# Patient Record
Sex: FEMALE | Race: BLACK OR AFRICAN AMERICAN | URBAN - METROPOLITAN AREA
[De-identification: names, ages, dates, MRNs, and addresses within clinical notes are randomized per-mention and may not be internally consistent; named-entity substitution may affect disease eponyms.]

---

## 2017-09-04 ENCOUNTER — ED HISTORICAL/CONVERTED ENCOUNTER (OUTPATIENT)
Dept: OTHER | Age: 72
End: 2017-09-04

## 2017-10-22 ENCOUNTER — ED HISTORICAL/CONVERTED ENCOUNTER (OUTPATIENT)
Dept: OTHER | Age: 72
End: 2017-10-22

## 2019-08-04 ENCOUNTER — ED HISTORICAL/CONVERTED ENCOUNTER (OUTPATIENT)
Dept: OTHER | Age: 74
End: 2019-08-04

## 2020-08-27 ENCOUNTER — ED HISTORICAL/CONVERTED ENCOUNTER (OUTPATIENT)
Dept: OTHER | Age: 75
End: 2020-08-27

## 2022-11-08 DIAGNOSIS — M25.561 RIGHT KNEE PAIN, UNSPECIFIED CHRONICITY: Primary | ICD-10-CM

## 2022-11-11 ENCOUNTER — OFFICE VISIT (OUTPATIENT)
Dept: ORTHOPEDIC SURGERY | Age: 77
End: 2022-11-11

## 2022-11-11 VITALS — BODY MASS INDEX: 35.16 KG/M2 | HEIGHT: 67 IN | WEIGHT: 224 LBS

## 2022-11-11 RX ORDER — HYDROCHLOROTHIAZIDE 25 MG/1
TABLET ORAL
COMMUNITY
Start: 2022-09-22

## 2022-11-11 RX ORDER — OMEPRAZOLE 20 MG/1
CAPSULE, DELAYED RELEASE ORAL
COMMUNITY
Start: 2022-09-22

## 2022-11-11 RX ORDER — LISINOPRIL 20 MG/1
TABLET ORAL
COMMUNITY
Start: 2022-09-22

## 2022-11-11 NOTE — LETTER
Ravin    1945   [id number]       11/11/2022       I hereby authorize and direct Art Black MD, Brittni Grider, and whomever he may designate as his associate to perform upon myself the following procedure:    Injection of: Kenalog, Supartz, Euflexxa, Orthovisc in the Right/Left ____________________. If any unforeseen condition arises in the course of the procedure, I further authorize him and his associated and/or assistant(s) to do whatever he/she deems advisable. The nature, purpose, benefits, risks, side effects, likelihood of achieving goals, and potential problems that might occur during recuperation, risks for not receiving the proposed care, treatment and services and alternatives of the procedure have been fully explained to me by my physician including, but not limited to:    Swelling, joint pain, skin pigment changes, worsening of condition, and failure to improve. I acknowledge that no guarantee or assurance has been made to me as to the results that may be obtained or the likelihood of success.                 _______________________________________     Signature of patient or authorized representative                [employer] [facility] fax: [phone]

## 2023-04-18 RX ORDER — TRIAMTERENE AND HYDROCHLOROTHIAZIDE 37.5; 25 MG/1; MG/1
1 CAPSULE ORAL DAILY
COMMUNITY

## 2023-04-18 RX ORDER — FAMOTIDINE 20 MG/1
20 TABLET, FILM COATED ORAL DAILY
COMMUNITY

## 2023-04-18 RX ORDER — DICYCLOMINE HYDROCHLORIDE 10 MG/1
10 CAPSULE ORAL 3 TIMES DAILY
COMMUNITY

## 2023-04-24 ENCOUNTER — ANESTHESIA EVENT (OUTPATIENT)
Dept: ENDOSCOPY | Age: 78
End: 2023-04-24
Payer: MEDICAID

## 2023-04-24 ENCOUNTER — APPOINTMENT (OUTPATIENT)
Dept: ENDOSCOPY | Age: 78
End: 2023-04-24
Attending: INTERNAL MEDICINE
Payer: MEDICAID

## 2023-04-24 ENCOUNTER — HOSPITAL ENCOUNTER (EMERGENCY)
Age: 78
Discharge: HOME | End: 2023-04-24
Attending: EMERGENCY MEDICINE
Payer: MEDICAID

## 2023-04-24 ENCOUNTER — ANESTHESIA (OUTPATIENT)
Dept: ENDOSCOPY | Age: 78
End: 2023-04-24
Payer: MEDICAID

## 2023-04-24 ENCOUNTER — HOSPITAL ENCOUNTER (OUTPATIENT)
Age: 78
Setting detail: OUTPATIENT SURGERY
Discharge: HOME | End: 2023-04-24
Attending: INTERNAL MEDICINE | Admitting: INTERNAL MEDICINE
Payer: MEDICAID

## 2023-04-24 VITALS
DIASTOLIC BLOOD PRESSURE: 68 MMHG | BODY MASS INDEX: 21.6 KG/M2 | WEIGHT: 110 LBS | HEIGHT: 60 IN | OXYGEN SATURATION: 99 % | SYSTOLIC BLOOD PRESSURE: 172 MMHG | RESPIRATION RATE: 20 BRPM | HEART RATE: 75 BPM | TEMPERATURE: 98.2 F

## 2023-04-24 VITALS
HEART RATE: 76 BPM | HEIGHT: 61 IN | OXYGEN SATURATION: 100 % | WEIGHT: 110 LBS | TEMPERATURE: 97.5 F | SYSTOLIC BLOOD PRESSURE: 182 MMHG | BODY MASS INDEX: 20.77 KG/M2 | RESPIRATION RATE: 20 BRPM | DIASTOLIC BLOOD PRESSURE: 92 MMHG

## 2023-04-24 DIAGNOSIS — R33.9 URINARY RETENTION: Primary | ICD-10-CM

## 2023-04-24 PROCEDURE — 74011000250 HC RX REV CODE- 250: Performed by: NURSE ANESTHETIST, CERTIFIED REGISTERED

## 2023-04-24 PROCEDURE — 74011250636 HC RX REV CODE- 250/636: Performed by: NURSE ANESTHETIST, CERTIFIED REGISTERED

## 2023-04-24 PROCEDURE — 2709999900 HC NON-CHARGEABLE SUPPLY: Performed by: INTERNAL MEDICINE

## 2023-04-24 PROCEDURE — 76060000032 HC ANESTHESIA 0.5 TO 1 HR: Performed by: INTERNAL MEDICINE

## 2023-04-24 PROCEDURE — 88305 TISSUE EXAM BY PATHOLOGIST: CPT

## 2023-04-24 PROCEDURE — 88342 IMHCHEM/IMCYTCHM 1ST ANTB: CPT

## 2023-04-24 PROCEDURE — 77030019988 HC FCPS ENDOSC DISP BSC -B: Performed by: INTERNAL MEDICINE

## 2023-04-24 PROCEDURE — 76040000007: Performed by: INTERNAL MEDICINE

## 2023-04-24 PROCEDURE — 74011250636 HC RX REV CODE- 250/636: Performed by: EMERGENCY MEDICINE

## 2023-04-24 RX ORDER — HYDRALAZINE HYDROCHLORIDE 20 MG/ML
INJECTION INTRAMUSCULAR; INTRAVENOUS AS NEEDED
Status: DISCONTINUED | OUTPATIENT
Start: 2023-04-24 | End: 2023-04-24 | Stop reason: HOSPADM

## 2023-04-24 RX ORDER — PROPOFOL 10 MG/ML
INJECTION, EMULSION INTRAVENOUS AS NEEDED
Status: DISCONTINUED | OUTPATIENT
Start: 2023-04-24 | End: 2023-04-24 | Stop reason: HOSPADM

## 2023-04-24 RX ORDER — MORPHINE SULFATE 4 MG/ML
4 INJECTION INTRAVENOUS ONCE
Status: COMPLETED | OUTPATIENT
Start: 2023-04-24 | End: 2023-04-24

## 2023-04-24 RX ORDER — SODIUM CHLORIDE, SODIUM LACTATE, POTASSIUM CHLORIDE, CALCIUM CHLORIDE 600; 310; 30; 20 MG/100ML; MG/100ML; MG/100ML; MG/100ML
INJECTION, SOLUTION INTRAVENOUS
Status: DISCONTINUED | OUTPATIENT
Start: 2023-04-24 | End: 2023-04-24 | Stop reason: HOSPADM

## 2023-04-24 RX ORDER — LIDOCAINE HYDROCHLORIDE 20 MG/ML
INJECTION, SOLUTION EPIDURAL; INFILTRATION; INTRACAUDAL; PERINEURAL AS NEEDED
Status: DISCONTINUED | OUTPATIENT
Start: 2023-04-24 | End: 2023-04-24 | Stop reason: HOSPADM

## 2023-04-24 RX ORDER — GLYCOPYRROLATE 0.2 MG/ML
INJECTION INTRAMUSCULAR; INTRAVENOUS AS NEEDED
Status: DISCONTINUED | OUTPATIENT
Start: 2023-04-24 | End: 2023-04-24 | Stop reason: HOSPADM

## 2023-04-24 RX ORDER — SODIUM CHLORIDE, SODIUM LACTATE, POTASSIUM CHLORIDE, CALCIUM CHLORIDE 600; 310; 30; 20 MG/100ML; MG/100ML; MG/100ML; MG/100ML
50 INJECTION, SOLUTION INTRAVENOUS CONTINUOUS
Status: DISCONTINUED | OUTPATIENT
Start: 2023-04-24 | End: 2023-04-24 | Stop reason: HOSPADM

## 2023-04-24 RX ADMIN — SODIUM CHLORIDE, POTASSIUM CHLORIDE, SODIUM LACTATE AND CALCIUM CHLORIDE: 600; 310; 30; 20 INJECTION, SOLUTION INTRAVENOUS at 09:13

## 2023-04-24 RX ADMIN — GLYCOPYRROLATE 0.2 MG: 0.2 INJECTION INTRAMUSCULAR; INTRAVENOUS at 09:20

## 2023-04-24 RX ADMIN — HYDRALAZINE HYDROCHLORIDE 10 MG: 20 INJECTION, SOLUTION INTRAMUSCULAR; INTRAVENOUS at 09:27

## 2023-04-24 RX ADMIN — LIDOCAINE HYDROCHLORIDE 80 MG: 20 INJECTION, SOLUTION EPIDURAL; INFILTRATION; INTRACAUDAL; PERINEURAL at 09:18

## 2023-04-24 RX ADMIN — MORPHINE SULFATE 4 MG: 4 INJECTION, SOLUTION INTRAMUSCULAR; INTRAVENOUS at 17:00

## 2023-04-24 RX ADMIN — PROPOFOL 100 MG: 10 INJECTION, EMULSION INTRAVENOUS at 09:22

## 2023-04-24 RX ADMIN — PROPOFOL 25 MG: 10 INJECTION, EMULSION INTRAVENOUS at 09:25

## 2023-04-24 NOTE — PROGRESS NOTES
aware of patients blood pressure. No new orders given at this time. Okay to continue with procedure.

## 2023-04-24 NOTE — ED TRIAGE NOTES
Pt complains of severe abd pain that started after her endoscopy this morning. States she called Dr Meredith Meadows and was told to come to ER. States she has not been able to urinate since the procedure but has the urge to go. Pt thrashing around triage and unable to sit in chair.

## 2023-04-24 NOTE — ED NOTES
Per pt request, goins bag changed to leg bag prior to discharge. Pt instructed to follow up with urology tomorrow. Pt ambulatory with steady gait upon discharge and declined wheelchair escort.

## 2023-04-24 NOTE — ED PROVIDER NOTES
[facility] EMERGENCY DEPT  EMERGENCY DEPARTMENT HISTORY AND PHYSICAL EXAM      Date: 4/24/2023  Patient Name: Orquidea Russell  MRN: [id number]  YOB: 1945  Date of evaluation: 4/24/2023  Provider: Eileen Skelton MD   Note Started: 5:47 PM 4/24/23    HISTORY OF PRESENT ILLNESS     Chief Complaint   Patient presents with    Abdominal Pain       History Provided By: Patient    HPI: Orquidea Russell is a 69 y.o. female presenting with lower abdominal pain and inability urinate since having an endoscopy this morning. No nausea, vomiting. Patient apparently urinated 1 time after the procedure but has not urinated since. PAST MEDICAL HISTORY   Past Medical History:  Past Medical History:   Diagnosis Date    Diabetes [facility])        Past Surgical History:  No past surgical history on file. Family History:  Family History   Problem Relation Age of Onset    No Known Problems Mother     No Known Problems Father        Social History:  Social History     Tobacco Use    Smoking status: Every Day     Types: Cigarettes    Smokeless tobacco: Never   Vaping Use    Vaping Use: Never used   Substance Use Topics    Alcohol use: Never    Drug use: Not Currently       Allergies:  No Known Allergies    PCP: Shane, MD Adilene    Current Meds:   Discharge Medication List as of 4/24/2023  6:59 PM        CONTINUE these medications which have NOT CHANGED    Details   triamterene-hydroCHLOROthiazide (DYAZIDE) 37.5-25 mg per capsule Take 1 Capsule by mouth daily. , Historical Med      dicyclomine (BENTYL) 10 mg capsule Take 1 Capsule by mouth three (3) times daily. , Historical Med      famotidine (PEPCID) 20 mg tablet Take 1 Tablet by mouth daily. , Historical Med      hydroCHLOROthiazide (HYDRODIURIL) 25 mg tablet TAKE 1 TABLET BY MOUTH EVERY DAY IN THE MORNING FOR 30 DAYS, Historical Med      lisinopriL (PRINIVIL, ZESTRIL) 20 mg tablet TAKE 1 TABLET BY MOUTH DAILY FOR 90 DAYS, Historical Med      omeprazole (PRILOSEC) 20 mg capsule TAKE 1 CAPSULE BY MOUTH ONCE DAILY FOR 90 DAYS, Historical Med             PHYSICAL EXAM     ED Triage Vitals [04/24/23 1618]   ED Encounter Vitals Group      BP (!) 201/94      Pulse (Heart Rate) 71      Resp Rate 24      Temp 98.2 °F (36.8 °C)      Temp src       O2 Sat (%) 98 %      Weight 110 lb      Height 5'      Physical Exam  Vitals and nursing note reviewed. Constitutional:       General: She is in acute distress. Appearance: Normal appearance. HENT:      Head: Normocephalic and atraumatic. Mouth/Throat:      Mouth: Mucous membranes are moist.   Eyes:      Conjunctiva/sclera: Conjunctivae normal.   Pulmonary:      Effort: Pulmonary effort is normal. No respiratory distress. Abdominal:      Tenderness: There is abdominal tenderness in the suprapubic area. Musculoskeletal:      Cervical back: Normal range of motion. Skin:     General: Skin is warm and dry. Neurological:      General: No focal deficit present. Mental Status: She is alert and oriented to person, place, and time. Mental status is at baseline. SCREENINGS              LAB, EKG AND DIAGNOSTIC RESULTS   Labs:  No results found for this or any previous visit (from the past 12 hour(s)). EKG: Initial EKG interpreted by me. Not Applicable    Radiologic Studies:  Non-plain film images such as CT, Ultrasound and MRI are read by the radiologist. Plain radiographic images are visualized and preliminarily interpreted by the ED Physician with the following findings: Not Applicable    Interpretation per the Radiologist below, if available at the time of this note:  No results found. PROCEDURES   Unless otherwise noted below, none. Procedures      CRITICAL CARE TIME   Patient does not meet Critical Care Time, 0 minutes    ED COURSE and DIFFERENTIAL DIAGNOSIS/MDM   CC/HPI/PE Summary, DDx: Patient presenting with inability to urinate after outpatient endoscopy.   Bladder is distended and on bedside ultrasound appears to have 700 cc of urine. Indwelling Kiser catheter placed and patient had significant relief. Will discharge with catheter to follow-up with urology as outpatient    Records Reviewed (source and summary of external notes): Prior medical records and Nursing notes    Vitals:    Vitals:    04/24/23 1618 04/24/23 1813   BP: (!) 201/94 (!) 172/68   Pulse: 71 75   Resp: 24 20   Temp: 98.2 °F (36.8 °C)    SpO2: 98% 99%   Weight: 49.9 kg (110 lb)    Height: 5' (1.524 m)         ED COURSE  ED Course as of 04/24/23 2208 Mon Apr 24, 2023   1807 600 cc urine in bladder [KK]      ED Course User Index  [KK] Claudette Pates, MD       Disposition Considerations (Tests not done, Shared Decision Making, Pt Expectation of Test or Treatment.): Not Applicable    Patient was given the following medications:  Medications   morphine injection 4 mg (4 mg IntraVENous Given 4/24/23 1700)       CONSULTS: (Who and What was discussed)  None     Social Determinants affecting Dx or Tx: None    Smoking Cessation: Not Applicable    FINAL IMPRESSION     1. Urinary retention          DISPOSITION/PLAN   Discharged    Discharge Note: The patient is stable for discharge home. The signs, symptoms, diagnosis, and discharge instructions have been discussed, understanding conveyed, and agreed upon. The patient is to follow up as recommended or return to ER should their symptoms worsen. PATIENT REFERRED TO:  Follow-up Information       Follow up With Specialties Details Why Jyotsna Norton MD Urology   [address] [place] [address] [phone]                DISCHARGE MEDICATIONS:  Discharge Medication List as of 4/24/2023  6:59 PM            DISCONTINUED MEDICATIONS:  Discharge Medication List as of 4/24/2023  6:59 PM          I am the Primary Clinician of Record: Miriam Guajardo MD (electronically signed)    (Please note that parts of this dictation were completed with voice recognition software.  Quite often unanticipated grammatical, syntax, homophones, and other interpretive errors are inadvertently transcribed by the computer software. Please disregards these errors.  Please excuse any errors that have escaped final proofreading.)

## 2023-04-24 NOTE — ANESTHESIA PREPROCEDURE EVALUATION
Relevant Problems   No relevant active problems       Anesthetic History   No history of anesthetic complications            Review of Systems / Medical History  Patient summary reviewed, nursing notes reviewed and pertinent labs reviewed    Pulmonary  Within defined limits                 Neuro/Psych   Within defined limits           Cardiovascular  Within defined limits                     GI/Hepatic/Renal  Within defined limits              Endo/Other    Diabetes    Obesity     Other Findings            Past Medical History:   Diagnosis Date    Diabetes ([facility] [address]        No past surgical history on file. Current Outpatient Medications   Medication Instructions    dicyclomine (BENTYL) 10 mg, Oral, 3 TIMES DAILY    famotidine (PEPCID) 20 mg, Oral, DAILY    hydroCHLOROthiazide (HYDRODIURIL) 25 mg tablet TAKE 1 TABLET BY MOUTH EVERY DAY IN THE MORNING FOR 30 DAYS    lisinopriL (PRINIVIL, ZESTRIL) 20 mg tablet TAKE 1 TABLET BY MOUTH DAILY FOR 90 DAYS    omeprazole (PRILOSEC) 20 mg capsule TAKE 1 CAPSULE BY MOUTH ONCE DAILY FOR 90 DAYS    triamterene-hydroCHLOROthiazide (DYAZIDE) 37.5-25 mg per capsule 1 Capsule, Oral, DAILY       No current facility-administered medications for this encounter. Current Outpatient Medications   Medication Sig    triamterene-hydroCHLOROthiazide (DYAZIDE) 37.5-25 mg per capsule Take 1 Capsule by mouth daily.  dicyclomine (BENTYL) 10 mg capsule Take 1 Capsule by mouth three (3) times daily.  famotidine (PEPCID) 20 mg tablet Take 1 Tablet by mouth daily.  hydroCHLOROthiazide (HYDRODIURIL) 25 mg tablet TAKE 1 TABLET BY MOUTH EVERY DAY IN THE MORNING FOR 30 DAYS    lisinopriL (PRINIVIL, ZESTRIL) 20 mg tablet TAKE 1 TABLET BY MOUTH DAILY FOR 90 DAYS    omeprazole (PRILOSEC) 20 mg capsule TAKE 1 CAPSULE BY MOUTH ONCE DAILY FOR 90 DAYS       No data found.     No results found for: WBC, WBCLT, HGBPOC, HGB, HGBP, HCTPOC, HCT, PHCT, RBCH, PLT, MCV, HGBEXT, HCTEXT, PLTEXT  No results found for: NA, K, CL, CO2, AGAP, GLU, BUN, CREA, BUCR, GFRAA, GFRNA, CA, GFRAA  No results found for: APTT, PTP, INR, INREXT  No results found for: GLU, GLUCPOC      Physical Exam    Airway  Mallampati: II  TM Distance: 4 - 6 cm  Neck ROM: normal range of motion   Mouth opening: Normal     Cardiovascular    Rhythm: regular  Rate: normal         Dental    Dentition: Poor dentition, Upper partial plate and Lower partial plate     Pulmonary  Breath sounds clear to auscultation               Abdominal  GI exam deferred       Other Findings            Anesthetic Plan    ASA: 3  Anesthesia type: total IV anesthesia and MAC    Monitoring Plan: Continuous noninvasive hemodynamic monitoring      Induction: Intravenous  Anesthetic plan and risks discussed with: Patient

## 2023-04-24 NOTE — PROGRESS NOTES
Discharge instructions given to patient and daughter [address]. Both verbalized understanding.

## 2023-04-24 NOTE — ANESTHESIA POSTPROCEDURE EVALUATION
Procedure(s):  ESOPHAGOGASTRODUODENOSCOPY (EGD). total IV anesthesia, MAC    Anesthesia Post Evaluation      Multimodal analgesia: multimodal analgesia not used between 6 hours prior to anesthesia start to PACU discharge  Patient location during evaluation: bedside  Patient participation: waiting for patient participation  Level of consciousness: sleepy but conscious  Pain score: 0  Pain management: adequate  Airway patency: patent  Anesthetic complications: no  Cardiovascular status: hypertensive  Respiratory status: nasal cannula  Hydration status: acceptable  Post anesthesia nausea and vomiting:  none  Final Post Anesthesia Temperature Assessment:  Normothermia (36.0-37.5 degrees C)      INITIAL Post-op Vital signs: No vitals data found for the desired time range.

## 2023-04-26 ENCOUNTER — TELEPHONE (OUTPATIENT)
Dept: UROLOGY | Age: 78
End: 2023-04-26

## 2023-04-26 NOTE — TELEPHONE ENCOUNTER
Pt was seen [facility] ON 4/24 for urinary retention goins cath placed.  Needed to follow up with Dr. Russell Yang

## 2023-05-01 NOTE — PROGRESS NOTES
HISTORY OF PRESENT ILLNESS    Josefa Haile is a 69 y.o. female is  a new patient is here for urinary retention after endoscopic procedure. She has lower abdominal pain and was unable to void since the morning of the procedure. She is not on tamsulosin. Patient is not had any problems voiding in the past.  She is not diabetic she says. Denies fevers, chills, nausea vomiting weight loss or bone pain. No history of any neurological diseases. Her daughter is a nurse and verifies the statements           Past Medical History:  PMHx (including negatives):  has a past medical history of Burning with urination and Diabetes ([facility] [address] PSurgHx:  has no past surgical history on file. PSocHx:  reports that she has been smoking cigarettes. She has never used smokeless tobacco. She reports that she does not currently use drugs. She reports that she does not drink alcohol. Home Medications    Medication Sig Start Date End Date Taking? Authorizing Provider   tamsulosin (FLOMAX) 0.4 mg capsule Take 1 Capsule by mouth daily. 5/3/23  Yes Bo Suarez, TIFFANY   triamterene-hydroCHLOROthiazide (DYAZIDE) 37.5-25 mg per capsule Take 1 Capsule by mouth daily. Yes Provider, Historical   dicyclomine (BENTYL) 10 mg capsule Take 1 Capsule by mouth three (3) times daily. Yes Provider, Historical   famotidine (PEPCID) 20 mg tablet Take 1 Tablet by mouth daily. Yes Provider, Historical   hydroCHLOROthiazide (HYDRODIURIL) 25 mg tablet TAKE 1 TABLET BY MOUTH EVERY DAY IN THE MORNING FOR 30 DAYS 9/22/22  Yes Provider, Historical   lisinopriL (PRINIVIL, ZESTRIL) 20 mg tablet TAKE 1 TABLET BY MOUTH DAILY FOR 90 DAYS 9/22/22  Yes Provider, Historical   omeprazole (PRILOSEC) 20 mg capsule TAKE 1 CAPSULE BY MOUTH ONCE DAILY FOR 90 DAYS 9/22/22  Yes Provider, Historical        Chronic Conditions Addressed Today       1. Urinary retention - Primary     Relevant Medications     tamsulosin (FLOMAX) 0.4 mg capsule    2.  History of UTI Relevant Medications     tamsulosin (FLOMAX) 0.4 mg capsule       Review of Systems   Genitourinary:         Retention after procedure   All other systems reviewed and are negative. Patient denies the symptoms of COVID-19 per routine screening guidelines. Physical Exam  Vitals and nursing note reviewed. Constitutional:       General: She is in no acute distress. Appearance: Normal appearance. HENT:      Head: Normocephalic and atraumatic. Mouth/Throat:      Mouth: Mucous membranes are moist.   Eyes:      Conjunctiva/sclera: Conjunctivae normal.   Pulmonary:      Effort: Pulmonary effort is normal. No respiratory distress. Abdominal:      Tenderness: There is no abdominal tenderness in the suprapubic area. Kiser catheter is in place  Musculoskeletal:      Cervical back: Normal range of motion. Skin:     General: Skin is warm and dry. Neurological:      General: No focal deficit present. Mental Status: She is alert and oriented to person, place, and time. Mental status is at baseline. ASSESSMENT and PLAN  Diagnoses and all orders for this visit:    1. Urinary retention    2. History of UTI    Other orders  -     tamsulosin (FLOMAX) 0.4 mg capsule; Take 1 Capsule by mouth daily. Back for voiding trial in 2 days      Reynaldo Paul may have a reminder for a \"due or due soon\" health maintenance. The patient has been encouraged to contact their primary care provider for follow-up on this health maintenance or other necessary and/or routine health screening.      Mervat Thompson MD

## 2023-05-03 ENCOUNTER — OFFICE VISIT (OUTPATIENT)
Dept: UROLOGY | Age: 78
End: 2023-05-03
Payer: MEDICAID

## 2023-05-03 VITALS
OXYGEN SATURATION: 98 % | SYSTOLIC BLOOD PRESSURE: 196 MMHG | HEIGHT: 61 IN | HEART RATE: 65 BPM | TEMPERATURE: 97.5 F | RESPIRATION RATE: 16 BRPM | WEIGHT: 110 LBS | BODY MASS INDEX: 20.77 KG/M2 | DIASTOLIC BLOOD PRESSURE: 79 MMHG

## 2023-05-03 DIAGNOSIS — R33.9 URINARY RETENTION: Primary | ICD-10-CM

## 2023-05-03 DIAGNOSIS — Z87.440 HISTORY OF UTI: ICD-10-CM

## 2023-05-03 PROCEDURE — 1123F ACP DISCUSS/DSCN MKR DOCD: CPT | Performed by: UROLOGY

## 2023-05-03 PROCEDURE — 99204 OFFICE O/P NEW MOD 45 MIN: CPT | Performed by: UROLOGY

## 2023-05-03 RX ORDER — TAMSULOSIN HYDROCHLORIDE 0.4 MG/1
0.4 CAPSULE ORAL DAILY
Qty: 90 CAPSULE | Refills: 3 | Status: SHIPPED | OUTPATIENT
Start: 2023-05-03

## 2023-05-05 ENCOUNTER — OFFICE VISIT (OUTPATIENT)
Dept: UROLOGY | Age: 78
End: 2023-05-05

## 2023-05-05 DIAGNOSIS — R33.9 URINARY RETENTION: Primary | ICD-10-CM

## 2023-05-05 LAB
AVG FLOW RATE POC: 6 ML/SECONDS
FLOW TIME POC: 18 SECONDS
PEAK FLOW: 9 ML/SECONDS
TIME TO PEAK: 3 SECONDS
TOTAL VOLUME POC: 114 ML
VOIDING TIME POC: 19 SECONDS

## 2023-05-17 ENCOUNTER — OFFICE VISIT (OUTPATIENT)
Age: 78
End: 2023-05-17

## 2023-05-17 VITALS — TEMPERATURE: 92.8 F | SYSTOLIC BLOOD PRESSURE: 143 MMHG | DIASTOLIC BLOOD PRESSURE: 86 MMHG | HEART RATE: 59 BPM

## 2023-05-17 DIAGNOSIS — R33.9 URINARY RETENTION: ICD-10-CM

## 2023-05-17 DIAGNOSIS — N30.90 CYSTITIS: Primary | ICD-10-CM

## 2023-05-17 LAB
BILIRUBIN, URINE, POC: NEGATIVE
BLOOD URINE, POC: ABNORMAL
GLUCOSE URINE, POC: NEGATIVE
KETONES, URINE, POC: NEGATIVE
LEUKOCYTE ESTERASE, URINE, POC: ABNORMAL
NITRITE, URINE, POC: NEGATIVE
PH, URINE, POC: 6.5 (ref 4.6–8)
PROTEIN,URINE, POC: NEGATIVE
PVR, POC: NORMAL CC
SPECIFIC GRAVITY, URINE, POC: 1.02 (ref 1–1.03)
URINALYSIS CLARITY, POC: CLEAR
URINALYSIS COLOR, POC: YELLOW
UROBILINOGEN, POC: ABNORMAL

## 2023-05-17 RX ORDER — FAMOTIDINE 20 MG/1
20 TABLET, FILM COATED ORAL DAILY
COMMUNITY
Start: 2023-05-03

## 2023-05-17 RX ORDER — TAMSULOSIN HYDROCHLORIDE 0.4 MG/1
0.4 CAPSULE ORAL DAILY
Qty: 90 CAPSULE | Refills: 1 | Status: SHIPPED | OUTPATIENT
Start: 2023-05-17

## 2023-05-17 RX ORDER — CEPHALEXIN 500 MG/1
500 CAPSULE ORAL 3 TIMES DAILY
Qty: 21 CAPSULE | Refills: 0 | Status: SHIPPED | OUTPATIENT
Start: 2023-05-17 | End: 2023-05-24

## 2023-05-17 RX ORDER — TAMSULOSIN HYDROCHLORIDE 0.4 MG/1
CAPSULE ORAL DAILY
COMMUNITY
Start: 2023-05-03

## 2023-05-17 RX ORDER — DICYCLOMINE HYDROCHLORIDE 10 MG/1
CAPSULE ORAL
COMMUNITY
Start: 2023-03-22

## 2023-05-17 RX ORDER — HYDROCHLOROTHIAZIDE 25 MG/1
TABLET ORAL
COMMUNITY
Start: 2023-05-03

## 2023-05-17 ASSESSMENT — ENCOUNTER SYMPTOMS
ALLERGIC/IMMUNOLOGIC NEGATIVE: 1
GASTROINTESTINAL NEGATIVE: 1
BACK PAIN: 1

## 2023-05-17 NOTE — PROGRESS NOTES
HISTORY OF PRESENT ILLNESS    Rachelle Cooper is a 69 y.o. female is here for follow up after her voiding trial. The patient developed urinary retention after endoscopic procedure and casey catheter was placed. She had successful voiding trial 2 weeks ago. She has a history of UTI infections   She has been taking tamsulosin and reports no problems with voiding. Her PVR is 37 cc today, urine is postive for leuk and blood. She denies any symptoms of frequency,urgency, incontinence or gross hematuria. Chief Complaint   Patient presents with    Follow-up    Urinary Retention            Past Medical History:  PMHx (including negatives):  has a past medical history of Burning with urination and Diabetes ([facility] [address] PSurgHx:  has no past surgical history on file. PSocHx:  reports that she has been smoking. She has never used smokeless tobacco. She reports that she does not currently use drugs. She reports that she does not drink alcohol. @[facility]@     1. Cystitis  -     Culture, Urine  -     Cytology, urine  2. Urinary retention  -     AMB POC PVR, ELEN,POST-VOID RES,US,NON-IMAGING  -     AMB POC URINALYSIS DIP STICK AUTO W/O MICRO  -     Culture, Urine  -     Cytology, urine       Review of Systems   Constitutional: Negative. HENT: Negative. Cardiovascular: Negative. Gastrointestinal: Negative. Endocrine: Negative. Genitourinary: Negative. Musculoskeletal:  Positive for back pain. Allergic/Immunologic: Negative. Neurological: Negative. Hematological: Negative. Patient denies the symptoms of COVID-19 per routine screening guidelines. Physical Exam  HENT:      Nose: Nose normal.   Pulmonary:      Effort: Pulmonary effort is normal.   Abdominal:      Palpations: Abdomen is soft. Skin:     General: Skin is warm. Neurological:      General: No focal deficit present. Mental Status: She is alert.      ASSESSMENT and PLAN  Brett Finn was seen today for follow-up and urinary

## 2023-05-20 LAB — BACTERIA UR CULT: NORMAL

## 2023-06-02 RX ORDER — OMEPRAZOLE 20 MG/1
CAPSULE, DELAYED RELEASE ORAL
COMMUNITY
Start: 2023-05-18

## 2023-06-02 RX ORDER — LISINOPRIL 20 MG/1
TABLET ORAL
COMMUNITY
Start: 2023-05-18

## 2023-06-19 ENCOUNTER — OFFICE VISIT (OUTPATIENT)
Age: 78
End: 2023-06-19
Payer: COMMERCIAL

## 2023-06-19 VITALS
HEART RATE: 54 BPM | DIASTOLIC BLOOD PRESSURE: 86 MMHG | BODY MASS INDEX: 20.77 KG/M2 | TEMPERATURE: 97.2 F | HEIGHT: 61 IN | RESPIRATION RATE: 16 BRPM | WEIGHT: 110 LBS | OXYGEN SATURATION: 98 % | SYSTOLIC BLOOD PRESSURE: 205 MMHG

## 2023-06-19 DIAGNOSIS — Z87.440 HISTORY OF UTI: ICD-10-CM

## 2023-06-19 DIAGNOSIS — R33.9 URINARY RETENTION: Primary | ICD-10-CM

## 2023-06-19 LAB
BILIRUBIN, URINE, POC: NEGATIVE
BLOOD URINE, POC: NEGATIVE
GLUCOSE URINE, POC: NEGATIVE
KETONES, URINE, POC: NEGATIVE
LEUKOCYTE ESTERASE, URINE, POC: NORMAL
NITRITE, URINE, POC: NEGATIVE
PH, URINE, POC: 5.5 (ref 4.6–8)
PROTEIN,URINE, POC: NEGATIVE
PVR, POC: NORMAL CC
SPECIFIC GRAVITY, URINE, POC: 1.01 (ref 1–1.03)
URINALYSIS CLARITY, POC: CLEAR
URINALYSIS COLOR, POC: YELLOW
UROBILINOGEN, POC: NORMAL

## 2023-06-19 PROCEDURE — 81003 URINALYSIS AUTO W/O SCOPE: CPT | Performed by: UROLOGY

## 2023-06-19 PROCEDURE — 51798 US URINE CAPACITY MEASURE: CPT | Performed by: UROLOGY

## 2023-06-19 PROCEDURE — 1123F ACP DISCUSS/DSCN MKR DOCD: CPT | Performed by: UROLOGY

## 2023-06-19 PROCEDURE — 99214 OFFICE O/P EST MOD 30 MIN: CPT | Performed by: UROLOGY

## 2023-06-19 ASSESSMENT — ENCOUNTER SYMPTOMS: BACK PAIN: 1

## 2023-06-19 NOTE — PROGRESS NOTES
HISTORY OF PRESENT ILLNESS  Rachelle hCen is a 69 y.o. female. Chief Complaint   Patient presents with    Follow-up     High BP, ran out of BP meds, not taking HCTZ    Cystitis    Urinary Retention     Has a history of urinary retention. She stays on alpha blockers he is voiding well. She denies any fevers chills flank pain nausea vomiting biggest complaint today is she is lost her dog or the dog has been taken she is very upset about this rightly so         Patient denies the symptoms of COVID-19 per routine screening guidelines. Review of Systems   Musculoskeletal:  Positive for arthralgias, back pain, myalgias, neck pain and neck stiffness. All other systems reviewed and are negative. Past Medical History:  PMHx (including negatives):  has a past medical history of Burning with urination and Diabetes ([facility] [address] PSurgHx:  has no past surgical history on file. PSocHx:  reports that she has been smoking. She has never used smokeless tobacco. She reports that she does not currently use drugs. She reports that she does not drink alcohol. [unfilled]   Physical Exam   Vitals and nursing note reviewed. Constitutional:        General: She is in no acute distress. Appearance: Normal appearance. HENT:       Head: Normocephalic and atraumatic. Mouth/Throat:       Mouth: Mucous membranes are moist.    Eyes:       Conjunctiva/sclera: Conjunctivae normal.    Pulmonary:       Effort: Pulmonary effort is normal. No respiratory distress. Abdominal:       Tenderness: There is no abdominal tenderness in the suprapubic area . Alvares catheter is in place   Musculoskeletal:       Cervical back: Normal range of motion. Skin:      General: Skin is warm and dry. Neurological:       General: No focal deficit present. Mental Status: She is alert and oriented to person, place, and time. Mental status is at baseline.    ASSESSMENT and PLAN  Amanda Vasquez was seen today for follow-up, cystitis and

## 2023-06-22 LAB
BACTERIA UR CULT: ABNORMAL
BACTERIA UR CULT: ABNORMAL

## 2023-06-29 RX ORDER — AMOXICILLIN AND CLAVULANATE POTASSIUM 875; 125 MG/1; MG/1
1 TABLET, FILM COATED ORAL 2 TIMES DAILY
Qty: 14 TABLET | Refills: 0 | Status: SHIPPED | OUTPATIENT
Start: 2023-06-29 | End: 2023-07-06

## 2023-12-19 PROBLEM — Z87.440 PERSONAL HISTORY OF URINARY (TRACT) INFECTIONS: Status: ACTIVE | Noted: 2023-05-03

## 2023-12-19 PROBLEM — R33.9 RETENTION OF URINE, UNSPECIFIED: Status: ACTIVE | Noted: 2023-05-03

## 2023-12-19 PROBLEM — N30.90 CYSTITIS: Status: ACTIVE | Noted: 2023-12-19

## 2023-12-19 PROBLEM — R35.0 FREQUENCY OF MICTURITION: Status: ACTIVE | Noted: 2023-12-19

## 2024-02-02 ENCOUNTER — APPOINTMENT (OUTPATIENT)
Facility: HOSPITAL | Age: 79
End: 2024-02-02
Payer: COMMERCIAL

## 2024-02-02 ENCOUNTER — HOSPITAL ENCOUNTER (EMERGENCY)
Facility: HOSPITAL | Age: 79
Discharge: HOME | End: 2024-02-02
Attending: STUDENT IN AN ORGANIZED HEALTH CARE EDUCATION/TRAINING PROGRAM
Payer: COMMERCIAL

## 2024-02-02 VITALS
HEIGHT: 61 IN | HEART RATE: 56 BPM | BODY MASS INDEX: 22.28 KG/M2 | OXYGEN SATURATION: 100 % | WEIGHT: 118 LBS | DIASTOLIC BLOOD PRESSURE: 79 MMHG | TEMPERATURE: 98 F | RESPIRATION RATE: 18 BRPM | SYSTOLIC BLOOD PRESSURE: 199 MMHG

## 2024-02-02 DIAGNOSIS — E04.1 THYROID CYST: Primary | ICD-10-CM

## 2024-02-02 LAB
ALBUMIN SERPL-MCNC: 3.3 G/DL (ref 3.5–5)
ALBUMIN/GLOB SERPL: 0.9 (ref 1.1–2.2)
ALP SERPL-CCNC: 75 U/L (ref 45–117)
ALT SERPL-CCNC: 12 U/L (ref 12–78)
ANION GAP SERPL CALC-SCNC: 1 MMOL/L (ref 5–15)
AST SERPL W P-5'-P-CCNC: 13 U/L (ref 15–37)
BASOPHILS # BLD: 0 K/UL (ref 0–0.1)
BASOPHILS NFR BLD: 0 % (ref 0–1)
BILIRUB SERPL-MCNC: 0.2 MG/DL (ref 0.2–1)
BUN SERPL-MCNC: 12 MG/DL (ref 6–20)
BUN/CREAT SERPL: 12 (ref 12–20)
CA-I BLD-MCNC: 9.1 MG/DL (ref 8.5–10.1)
CHLORIDE SERPL-SCNC: 114 MMOL/L (ref 97–108)
CO2 SERPL-SCNC: 27 MMOL/L (ref 21–32)
CREAT SERPL-MCNC: 1.03 MG/DL (ref 0.55–1.02)
DIFFERENTIAL METHOD BLD: ABNORMAL
EOSINOPHIL # BLD: 0 K/UL (ref 0–0.4)
EOSINOPHIL NFR BLD: 1 % (ref 0–7)
ERYTHROCYTE [DISTWIDTH] IN BLOOD BY AUTOMATED COUNT: 14.4 % (ref 11.5–14.5)
GLOBULIN SER CALC-MCNC: 3.6 G/DL (ref 2–4)
GLUCOSE SERPL-MCNC: 104 MG/DL (ref 65–100)
HCT VFR BLD AUTO: 34.8 % (ref 35–47)
HGB BLD-MCNC: 11.7 G/DL (ref 11.5–16)
IMM GRANULOCYTES # BLD AUTO: 0 K/UL (ref 0–0.04)
IMM GRANULOCYTES NFR BLD AUTO: 0 % (ref 0–0.5)
LYMPHOCYTES # BLD: 1.9 K/UL (ref 0.8–3.5)
LYMPHOCYTES NFR BLD: 22 % (ref 12–49)
MCH RBC QN AUTO: 31 PG (ref 26–34)
MCHC RBC AUTO-ENTMCNC: 33.6 G/DL (ref 30–36.5)
MCV RBC AUTO: 92.1 FL (ref 80–99)
MONOCYTES # BLD: 0.8 K/UL (ref 0–1)
MONOCYTES NFR BLD: 9 % (ref 5–13)
NEUTS SEG # BLD: 5.8 K/UL (ref 1.8–8)
NEUTS SEG NFR BLD: 68 % (ref 32–75)
NRBC # BLD: 0 K/UL (ref 0–0.01)
NRBC BLD-RTO: 0 PER 100 WBC
PLATELET # BLD AUTO: 204 K/UL (ref 150–400)
PMV BLD AUTO: 11.9 FL (ref 8.9–12.9)
POTASSIUM SERPL-SCNC: 4.1 MMOL/L (ref 3.5–5.1)
PROT SERPL-MCNC: 6.9 G/DL (ref 6.4–8.2)
RBC # BLD AUTO: 3.78 M/UL (ref 3.8–5.2)
SODIUM SERPL-SCNC: 142 MMOL/L (ref 136–145)
WBC # BLD AUTO: 8.7 K/UL (ref 3.6–11)

## 2024-02-02 PROCEDURE — 93005 ELECTROCARDIOGRAM TRACING: CPT | Performed by: STUDENT IN AN ORGANIZED HEALTH CARE EDUCATION/TRAINING PROGRAM

## 2024-02-02 PROCEDURE — 6360000004 HC RX CONTRAST MEDICATION: Performed by: STUDENT IN AN ORGANIZED HEALTH CARE EDUCATION/TRAINING PROGRAM

## 2024-02-02 PROCEDURE — 85025 COMPLETE CBC W/AUTO DIFF WBC: CPT

## 2024-02-02 PROCEDURE — 80053 COMPREHEN METABOLIC PANEL: CPT

## 2024-02-02 PROCEDURE — 70498 CT ANGIOGRAPHY NECK: CPT

## 2024-02-02 PROCEDURE — 6370000000 HC RX 637 (ALT 250 FOR IP): Performed by: STUDENT IN AN ORGANIZED HEALTH CARE EDUCATION/TRAINING PROGRAM

## 2024-02-02 PROCEDURE — 36415 COLL VENOUS BLD VENIPUNCTURE: CPT

## 2024-02-02 PROCEDURE — 99285 EMERGENCY DEPT VISIT HI MDM: CPT

## 2024-02-02 RX ORDER — HYDROCHLOROTHIAZIDE 25 MG/1
25 TABLET ORAL
Status: COMPLETED | OUTPATIENT
Start: 2024-02-02 | End: 2024-02-02

## 2024-02-02 RX ADMIN — HYDROCHLOROTHIAZIDE 25 MG: 25 TABLET ORAL at 18:53

## 2024-02-02 RX ADMIN — IOPAMIDOL 100 ML: 755 INJECTION, SOLUTION INTRAVENOUS at 18:56

## 2024-02-02 ASSESSMENT — PAIN - FUNCTIONAL ASSESSMENT
PAIN_FUNCTIONAL_ASSESSMENT: NONE - DENIES PAIN
PAIN_FUNCTIONAL_ASSESSMENT: NONE - DENIES PAIN

## 2024-02-02 ASSESSMENT — LIFESTYLE VARIABLES
HOW MANY STANDARD DRINKS CONTAINING ALCOHOL DO YOU HAVE ON A TYPICAL DAY: PATIENT DOES NOT DRINK
HOW OFTEN DO YOU HAVE A DRINK CONTAINING ALCOHOL: NEVER

## 2024-02-02 ASSESSMENT — PAIN SCALES - GENERAL: PAINLEVEL_OUTOF10: 0

## 2024-02-02 NOTE — ED TRIAGE NOTES
Pt arrives to ED co hypertension and right sided neck swelling. PCP sent her here today for evaluation of this. Denies any sick symptoms. Ao4. No difficulty swallowing.

## 2024-02-03 LAB
EKG ATRIAL RATE: 59 BPM
EKG DIAGNOSIS: NORMAL
EKG P-R INTERVAL: 128 MS
EKG Q-T INTERVAL: 388 MS
EKG QRS DURATION: 70 MS
EKG QTC CALCULATION (BAZETT): 384 MS
EKG R AXIS: 49 DEGREES
EKG T AXIS: 81 DEGREES
EKG VENTRICULAR RATE: 59 BPM

## 2024-02-03 NOTE — ED PROVIDER NOTES
[facility] EMERGENCY DEPT  EMERGENCY DEPARTMENT HISTORY AND PHYSICAL EXAM      Date: 2/2/2024  Patient Name: Rachelle Morrow  MRN: [id number]  Birthdate 1945  Date of evaluation: 2/2/2024  Provider: Nick Arizmendi MD   Note Started: 8:45 PM EST 2/2/24    HISTORY OF PRESENT ILLNESS     Chief Complaint   Patient presents with    Hypertension    Neck Swelling       History Provided By: Patient    HPI: Rachelle Morrow is a 78 y.o. female with past medical history as reviewed below presents for evaluation of right neck mass.  Patient states masses, ongoing over the last week.  No associated pain, no dysphagia.  No fevers or chills.  No skin changes.  No syncope.  No other complaints.    PAST MEDICAL HISTORY   Past Medical History:  Past Medical History:   Diagnosis Date    Burning with urination     Diabetes (HCC)        Past Surgical History:  History reviewed. No pertinent surgical history.    Family History:  Family History   Problem Relation Age of Onset    No Known Problems Mother     No Known Problems Father        Social History:  Social History     Tobacco Use    Smoking status: Every Day    Smokeless tobacco: Never   Vaping Use    Vaping Use: Never used   Substance Use Topics    Alcohol use: Never    Drug use: Not Currently       Allergies:  No Known Allergies    PCP: Alycia Ponce APRN - NP    Current Meds:   No current facility-administered medications for this encounter.     Current Outpatient Medications   Medication Sig Dispense Refill    tamsulosin (FLOMAX) 0.4 MG capsule Take 1 capsule by mouth daily 90 capsule 3    lisinopril (PRINIVIL;ZESTRIL) 20 MG tablet  (Patient not taking: Reported on 6/19/2023)      omeprazole (PRILOSEC) 20 MG delayed release capsule       dicyclomine (BENTYL) 10 MG capsule TAKE 1 ORAL CAPSULE 3 TIMES A DAY AS NEEDED FOR STOMACH SPASMS      famotidine (PEPCID) 20 MG tablet Take 1 tablet by mouth daily      hydroCHLOROthiazide (HYDRODIURIL) 25 MG tablet TAKE 1 TABLET BY

## 2024-04-29 ENCOUNTER — OFFICE VISIT (OUTPATIENT)
Age: 79
End: 2024-04-29
Payer: COMMERCIAL

## 2024-04-29 VITALS
HEIGHT: 61 IN | BODY MASS INDEX: 19.63 KG/M2 | OXYGEN SATURATION: 98 % | HEART RATE: 70 BPM | DIASTOLIC BLOOD PRESSURE: 76 MMHG | SYSTOLIC BLOOD PRESSURE: 174 MMHG | TEMPERATURE: 97.7 F | RESPIRATION RATE: 18 BRPM | WEIGHT: 104 LBS

## 2024-04-29 DIAGNOSIS — E04.1 THYROID CYST: ICD-10-CM

## 2024-04-29 DIAGNOSIS — I10 PRIMARY HYPERTENSION: ICD-10-CM

## 2024-04-29 DIAGNOSIS — E04.1 THYROID CYST: Primary | ICD-10-CM

## 2024-04-29 PROCEDURE — 3078F DIAST BP <80 MM HG: CPT | Performed by: STUDENT IN AN ORGANIZED HEALTH CARE EDUCATION/TRAINING PROGRAM

## 2024-04-29 PROCEDURE — 99203 OFFICE O/P NEW LOW 30 MIN: CPT | Performed by: STUDENT IN AN ORGANIZED HEALTH CARE EDUCATION/TRAINING PROGRAM

## 2024-04-29 PROCEDURE — 3077F SYST BP >= 140 MM HG: CPT | Performed by: STUDENT IN AN ORGANIZED HEALTH CARE EDUCATION/TRAINING PROGRAM

## 2024-04-29 PROCEDURE — 1123F ACP DISCUSS/DSCN MKR DOCD: CPT | Performed by: STUDENT IN AN ORGANIZED HEALTH CARE EDUCATION/TRAINING PROGRAM

## 2024-04-29 NOTE — PROGRESS NOTES
[facility]                                                                                    Yesica Enamorado M.D          Patient Information  Date:4/29/2024  Name : Rachelle Morrow 79 y.o.     YOB: 1945         Referred by: Alycia Ponce APRN - NP       Chief Complaint   Patient presents with    New Patient    Thyroid Problem     Nontoxic single thyroid nodule         History of Present Illness: Rcahelle Morrow is a 79 y.o. female with hypertension who presented to [facility] care.     Patient reports she noticed a swelling on the right side of her neck in January 2024. Reports she went to the ER in February 2024 and a CT of neck.   Reports swelling has decreased in size.   Reports her mother had a ggoiter and surgery.   Mother had lung cancer       Thyroid nodule noted incidentally during imaging. Yes   Difficulty in swallowing (food/pills getting stuck):no  Difficulty in breathing: no  Visible swelling in the neck:es   Hoarseness of voice: no  Family history of thyroid cancer: no  Personal history of radiation exposure to head/neck region:no  Family/personal history of other cancers:no  Symptoms of hypo/hyperthyroidism: No       Past Medical History:   Diagnosis Date    Burning with urination     Hypertension        History reviewed. No pertinent surgical history.     Social History     Socioeconomic History    Marital status: Single     Spouse name: Not on file    Number of children: Not on file    Years of education: Not on file    Highest education level: Not on file   Occupational History    Not on file   Tobacco Use    Smoking status: Never    Smokeless tobacco: Never   Vaping Use    Vaping Use: Never used   Substance and Sexual Activity    Alcohol use: Never    Drug use: Yes     Types: Marijuana (Weed)    Sexual activity: Not on file   Other Topics Concern    Not on file   Social History Narrative    Not on file     Social Determinants of Health

## 2024-04-29 NOTE — PROGRESS NOTES
1. \"Have you been to the ER, urgent care clinic since your last visit?  Hospitalized since your last visit?\" Yes-2/2/24    2. \"Have you seen or consulted any other health care providers outside of the [facility] since your last visit?\" Yes-PCP    3. For patients aged 45-75: Has the patient had a colonoscopy / FIT/ Cologuard? N/A      If the patient is female:    4. For patients aged 40-74: Has the patient had a mammogram within the past 2 years?       5. For patients aged 21-65: Has the patient had a pap smear?     Chief Complaint   Patient presents with    New Patient    Thyroid Problem     Nontoxic single thyroid nodule       BP (!) 173/81 (Site: Left Upper Arm, Position: Sitting, Cuff Size: Medium Adult)   Pulse 70   Temp 97.7 °F (36.5 °C) (Temporal)   Resp 18   Ht 1.549 m (5' 1\")   Wt 47.2 kg (104 lb)   SpO2 98%   BMI 19.65 kg/m²     BP (!) 174/76 (Site: Right Upper Arm, Position: Sitting, Cuff Size: Medium Adult)   Pulse 70   Temp 97.7 °F (36.5 °C) (Temporal)   Resp 18   Ht 1.549 m (5' 1\")   Wt 47.2 kg (104 lb)   SpO2 98%   BMI 19.65 kg/m²

## 2024-05-09 ENCOUNTER — TELEPHONE (OUTPATIENT)
Age: 79
End: 2024-05-09

## 2024-05-09 LAB
T4 FREE SERPL-MCNC: 1.39 NG/DL (ref 0.82–1.77)
TSH SERPL DL<=0.005 MIU/L-ACNC: 0.58 UIU/ML (ref 0.45–4.5)

## 2024-05-09 NOTE — RESULT ENCOUNTER NOTE
Please let patient know that thyroid tests look ok we are waiting for her ultrasound    Thank you,  Dr. Enamorado

## 2024-05-09 NOTE — TELEPHONE ENCOUNTER
Discussed w/ pt    ----- Message from Yesica Enamorado MD sent at 5/9/2024  8:56 AM EDT -----  Please let patient know that thyroid tests look ok we are waiting for her ultrasound    Thank you,  Dr. Enamorado

## 2024-06-19 ENCOUNTER — OFFICE VISIT (OUTPATIENT)
Age: 79
End: 2024-06-19
Payer: COMMERCIAL

## 2024-06-19 VITALS — SYSTOLIC BLOOD PRESSURE: 131 MMHG | HEART RATE: 61 BPM | DIASTOLIC BLOOD PRESSURE: 78 MMHG

## 2024-06-19 DIAGNOSIS — R35.0 FREQUENCY OF MICTURITION: ICD-10-CM

## 2024-06-19 DIAGNOSIS — Z87.440 PERSONAL HISTORY OF URINARY (TRACT) INFECTIONS: ICD-10-CM

## 2024-06-19 DIAGNOSIS — N30.90 CYSTITIS: ICD-10-CM

## 2024-06-19 DIAGNOSIS — N34.2 ATROPHIC URETHRITIS: ICD-10-CM

## 2024-06-19 DIAGNOSIS — R33.9 RETENTION OF URINE, UNSPECIFIED: Primary | ICD-10-CM

## 2024-06-19 DIAGNOSIS — R82.81 PYURIA: ICD-10-CM

## 2024-06-19 LAB
BILIRUBIN, URINE, POC: NORMAL
BLOOD URINE, POC: NEGATIVE
GLUCOSE URINE, POC: NEGATIVE
KETONES, URINE, POC: NEGATIVE
LEUKOCYTE ESTERASE, URINE, POC: NORMAL
NITRITE, URINE, POC: NEGATIVE
PH, URINE, POC: 6 (ref 4.6–8)
PROTEIN,URINE, POC: 30
PVR, POC: NORMAL CC
SPECIFIC GRAVITY, URINE, POC: 1.03 (ref 1–1.03)
URINALYSIS CLARITY, POC: CLEAR
URINALYSIS COLOR, POC: YELLOW
UROBILINOGEN, POC: NORMAL

## 2024-06-19 PROCEDURE — 51798 US URINE CAPACITY MEASURE: CPT | Performed by: UROLOGY

## 2024-06-19 PROCEDURE — 81003 URINALYSIS AUTO W/O SCOPE: CPT | Performed by: UROLOGY

## 2024-06-19 PROCEDURE — 99214 OFFICE O/P EST MOD 30 MIN: CPT | Performed by: UROLOGY

## 2024-06-19 PROCEDURE — 1123F ACP DISCUSS/DSCN MKR DOCD: CPT | Performed by: UROLOGY

## 2024-06-19 RX ORDER — ESTRADIOL 0.1 MG/G
1 CREAM VAGINAL
Qty: 42.5 G | Refills: 3 | Status: SHIPPED | OUTPATIENT
Start: 2024-06-19

## 2024-06-19 NOTE — PROGRESS NOTES
HISTORY OF PRESENT ILLNESS  Rachelle Morrow is a 79 y.o. female   Patient returns today doing relatively well.  Slow stream at times but she is emptying her bladder well which is a small residual today less than 30 cc.  She denies any fevers chills flank pain nausea vomiting weight loss.  We talked about using Estrace cream pea-sized Monday Wednesday Friday and see me back in a year  1. Retention of urine, unspecified  Overview:  Managed with Tamsulosin.    S/p urethral dilation to 24 [place] on 2023   Orders:  -     AMB POC URINALYSIS DIP STICK AUTO W/O MICRO  -     AMB POC PVR, ELEN,POST-VOID RES,US,NON-IMAGING  2. Frequency of micturition  3. Personal history of urinary (tract) infections  4. Cystitis  5. Atrophic urethritis  -     estradiol (ESTRACE VAGINAL) 0.1 MG/GM vaginal cream; Place 1 g vaginally three times a week Do not use applicator, use fingertip to apply a pea size drop Monday Wednesday Friday, Disp-42.5 g, R-3Normal        PAST MEDICAL HISTORY  PMHx (including negatives):  has a past medical history of Burning with urination and Hypertension.   PSurgHx:  has no past surgical history on file.  PSocHx:  reports that she has never smoked. She has never used smokeless tobacco. She reports current drug use. Drug: Marijuana (Weed). She reports that she does not drink alcohol.   FamilyHX:   Family History   Problem Relation Age of Onset    Thyroid Disease Mother         thyroid removal    No Known Problems Father        ROS  Review of Systems   All other systems reviewed and are negative.        PHYSICAL EXAM  Physical Exam  HENT:      Head: Normocephalic and atraumatic.      Nose: Nose normal.      Mouth/Throat:      Mouth: Mucous membranes are moist.   Eyes:      Extraocular Movements: Extraocular movements intact.      Pupils: Pupils are equal, round, and reactive to light.   Neck:      Comments: Pulsatile right neck mass with bruit  Cardiovascular:      Rate and Rhythm: Normal rate and regular rhythm.

## 2024-06-19 NOTE — PROGRESS NOTES
Chief Complaint   Patient presents with    Follow-up    Urinary Catheter    Urinary Frequency        /78   Pulse 61      PHQ-9 score is    Negative      1. \"Have you been to the ER, urgent care clinic since your last visit?  Hospitalized since your last visit?\" No    2. \"Have you seen or consulted any other health care providers outside of the [facility] since your last visit?\" No     3. For patients aged 45-75: Has the patient had a colonoscopy / FIT/ Cologuard? NA - based on age      If the patient is female:    4. For patients aged 40-74: Has the patient had a mammogram within the past 2 years? NA - based on age or sex      5. For patients aged 21-65: Has the patient had a pap smear? NA - based on age or sex

## 2024-06-20 LAB — BACTERIA UR CULT: NORMAL

## 2024-07-30 ENCOUNTER — OFFICE VISIT (OUTPATIENT)
Age: 79
End: 2024-07-30
Payer: COMMERCIAL

## 2024-07-30 VITALS
SYSTOLIC BLOOD PRESSURE: 202 MMHG | HEIGHT: 61 IN | WEIGHT: 99.5 LBS | TEMPERATURE: 98.3 F | HEART RATE: 58 BPM | BODY MASS INDEX: 18.78 KG/M2 | OXYGEN SATURATION: 99 % | DIASTOLIC BLOOD PRESSURE: 85 MMHG | RESPIRATION RATE: 16 BRPM

## 2024-07-30 DIAGNOSIS — E04.1 THYROID CYST: Primary | ICD-10-CM

## 2024-07-30 DIAGNOSIS — I10 PRIMARY HYPERTENSION: ICD-10-CM

## 2024-07-30 PROCEDURE — 3079F DIAST BP 80-89 MM HG: CPT | Performed by: STUDENT IN AN ORGANIZED HEALTH CARE EDUCATION/TRAINING PROGRAM

## 2024-07-30 PROCEDURE — 3077F SYST BP >= 140 MM HG: CPT | Performed by: STUDENT IN AN ORGANIZED HEALTH CARE EDUCATION/TRAINING PROGRAM

## 2024-07-30 PROCEDURE — 1123F ACP DISCUSS/DSCN MKR DOCD: CPT | Performed by: STUDENT IN AN ORGANIZED HEALTH CARE EDUCATION/TRAINING PROGRAM

## 2024-07-30 PROCEDURE — 99214 OFFICE O/P EST MOD 30 MIN: CPT | Performed by: STUDENT IN AN ORGANIZED HEALTH CARE EDUCATION/TRAINING PROGRAM

## 2024-07-30 RX ORDER — FAMOTIDINE 20 MG/1
20 TABLET, FILM COATED ORAL DAILY
Qty: 60 TABLET | Refills: 0 | Status: SHIPPED | OUTPATIENT
Start: 2024-07-30

## 2024-07-30 RX ORDER — DICYCLOMINE HYDROCHLORIDE 10 MG/1
CAPSULE ORAL
Qty: 120 CAPSULE | Refills: 0 | Status: SHIPPED | OUTPATIENT
Start: 2024-07-30

## 2024-07-30 RX ORDER — LIDOCAINE 50 MG/G
1 PATCH TOPICAL DAILY
COMMUNITY

## 2024-07-30 RX ORDER — TAMSULOSIN HYDROCHLORIDE 0.4 MG/1
0.4 CAPSULE ORAL DAILY
COMMUNITY
End: 2024-07-30

## 2024-07-30 RX ORDER — LISINOPRIL 20 MG/1
20 TABLET ORAL DAILY
Qty: 90 TABLET | Refills: 0 | Status: SHIPPED | OUTPATIENT
Start: 2024-07-30

## 2024-07-30 RX ORDER — HYDROCHLOROTHIAZIDE 25 MG/1
TABLET ORAL
Qty: 90 TABLET | Refills: 0 | Status: SHIPPED | OUTPATIENT
Start: 2024-07-30

## 2024-07-30 NOTE — PROGRESS NOTES
Chief Complaint   Patient presents with    Follow-up    Thyroid Problem     Thyroid cyst       BP (!) 197/82 (Site: Left Upper Arm, Position: Sitting, Cuff Size: Medium Adult)   Pulse 58   Temp 98.3 °F (36.8 °C) (Temporal)   Resp 16   Ht 1.549 m (5' 1\")   Wt 45.1 kg (99 lb 8 oz)   SpO2 99%   BMI 18.80 kg/m²     BP (!) 202/85 (Site: Right Upper Arm, Position: Sitting, Cuff Size: Medium Adult)   Pulse 58   Temp 98.3 °F (36.8 °C) (Temporal)   Resp 16   Ht 1.549 m (5' 1\")   Wt 45.1 kg (99 lb 8 oz)   SpO2 99%   BMI 18.80 kg/m²     
    Social Determinants of Health     Financial Resource Strain: Not on file   Food Insecurity: Not on file   Transportation Needs: Not on file   Physical Activity: Not on file   Stress: Not on file   Social Connections: Not on file   Intimate Partner Violence: Not on file   Housing Stability: Not on file       Family History   Problem Relation Age of Onset    Thyroid Disease Mother         thyroid removal    No Known Problems Father         Current Outpatient Medications   Medication Sig    hydroCHLOROthiazide (HYDRODIURIL) 25 MG tablet Take 1 tablet daily    famotidine (PEPCID) 20 MG tablet Take 1 tablet by mouth daily    dicyclomine (BENTYL) 10 MG capsule TAKE 1 ORAL CAPSULE 3 TIMES A DAY AS NEEDED FOR STOMACH SPASMS    lisinopril (PRINIVIL;ZESTRIL) 20 MG tablet Take 1 tablet by mouth daily    lidocaine (LIDODERM) 5 % Apply 1 patch topically daily (Patient not taking: Reported on 7/30/2024)    estradiol (ESTRACE VAGINAL) 0.1 MG/GM vaginal cream Place 1 g vaginally three times a week Do not use applicator, use fingertip to apply a pea size drop Monday Wednesday Friday (Patient not taking: Reported on 7/30/2024)     No current facility-administered medications for this visit.           Review of Systems: Per HPI    Physical Examination:  Blood pressure (!) 202/85, pulse 58, temperature 98.3 °F (36.8 °C), temperature source Temporal, resp. rate 16, height 1.549 m (5' 1\"), weight 45.1 kg (99 lb 8 oz), SpO2 99 %. Body mass index is 18.8 kg/m².  General: pleasant, no distress, good eye contact  HEENT: no pallor, no periorbital edema, EOMI  Neck: supple, neck swelling     Musculoskeletal: no edema  Neurological: alert and oriented  Psychiatric: normal mood and affect    Data Reviewed:     No results found for: \"HBA1C\", \"GLU\", \"GESTF\", \"GLUCPOC\", \"MCA2\", \"LDL\", \"DAGO\", \"CREAPOC\"   Lab Results   Component Value Date/Time    BUN 12 02/02/2024 06:13 PM     02/02/2024 06:13 PM    K 4.1 02/02/2024 06:13 PM

## 2024-08-05 ENCOUNTER — HOSPITAL ENCOUNTER (OUTPATIENT)
Facility: HOSPITAL | Age: 79
Discharge: HOME | End: 2024-08-08
Attending: STUDENT IN AN ORGANIZED HEALTH CARE EDUCATION/TRAINING PROGRAM
Payer: COMMERCIAL

## 2024-08-05 DIAGNOSIS — E04.1 THYROID CYST: ICD-10-CM

## 2024-08-05 PROCEDURE — 76536 US EXAM OF HEAD AND NECK: CPT

## 2024-08-13 DIAGNOSIS — E04.1 THYROID CYST: Primary | ICD-10-CM

## 2024-08-29 ENCOUNTER — HOSPITAL ENCOUNTER (OUTPATIENT)
Facility: HOSPITAL | Age: 79
End: 2024-08-29
Attending: STUDENT IN AN ORGANIZED HEALTH CARE EDUCATION/TRAINING PROGRAM
Payer: COMMERCIAL

## 2024-08-29 VITALS — RESPIRATION RATE: 20 BRPM

## 2024-08-29 DIAGNOSIS — E04.1 THYROID CYST: ICD-10-CM

## 2024-08-29 PROCEDURE — 88172 CYTP DX EVAL FNA 1ST EA SITE: CPT

## 2024-08-29 PROCEDURE — 88173 CYTOPATH EVAL FNA REPORT: CPT

## 2024-08-29 PROCEDURE — 10005 FNA BX W/US GDN 1ST LES: CPT

## 2024-08-29 NOTE — OR NURSING
Patient to IR for Thyroid FNA  Consent obtained, site prepped  Right thyroid FNA completed without complication  Patient tolerated procedure  Patient verbalized understanding of discharge instructions (using icepack, bandaid, s/s infection)  Discharged to Lobby/Home  Icepack provided for comfort

## 2024-09-05 ENCOUNTER — TELEPHONE (OUTPATIENT)
Age: 79
End: 2024-09-05

## 2024-09-05 NOTE — TELEPHONE ENCOUNTER
Called and informed pt of thyroid biopsy being benign. Pt was very pleased with the news. Pt expressed understanding  ----- Message from Dr. Yesica Enamorado MD sent at 9/5/2024  2:25 PM EDT -----  Thyroid biopsy is negative for cancer- benign  Take care,   Thank you,  Dr. Enamorado

## 2024-12-12 DIAGNOSIS — I10 PRIMARY HYPERTENSION: Primary | ICD-10-CM

## 2024-12-12 RX ORDER — LISINOPRIL 20 MG/1
20 TABLET ORAL DAILY
Qty: 90 TABLET | Refills: 0 | Status: SHIPPED | OUTPATIENT
Start: 2024-12-12

## 2025-01-09 DIAGNOSIS — R33.9 URINARY RETENTION: ICD-10-CM

## 2025-01-22 RX ORDER — TAMSULOSIN HYDROCHLORIDE 0.4 MG/1
CAPSULE ORAL DAILY
Qty: 90 CAPSULE | Refills: 3 | Status: SHIPPED | OUTPATIENT
Start: 2025-01-22

## 2025-03-10 DIAGNOSIS — I10 PRIMARY HYPERTENSION: ICD-10-CM

## 2025-03-10 RX ORDER — LISINOPRIL 20 MG/1
20 TABLET ORAL DAILY
Qty: 90 TABLET | Refills: 0 | OUTPATIENT
Start: 2025-03-10

## 2025-03-10 RX ORDER — HYDROCHLOROTHIAZIDE 25 MG/1
25 TABLET ORAL DAILY
Qty: 90 TABLET | Refills: 0 | OUTPATIENT
Start: 2025-03-10

## 2025-07-23 DIAGNOSIS — I10 PRIMARY HYPERTENSION: ICD-10-CM

## 2025-07-24 RX ORDER — HYDROCHLOROTHIAZIDE 25 MG/1
TABLET ORAL
Qty: 90 TABLET | Refills: 0 | OUTPATIENT
Start: 2025-07-24

## 2025-07-24 RX ORDER — LISINOPRIL 20 MG/1
20 TABLET ORAL DAILY
Qty: 90 TABLET | Refills: 0 | OUTPATIENT
Start: 2025-07-24

## (undated) DEVICE — BLOCK BITE STD GRN ORAL AD W/O STRP SLD PLAS DISP BITEGARD

## (undated) DEVICE — FORCEPS BX L240CM JAW DIA2.4MM ORNG L CAP W/ NDL DISP RAD

## (undated) DEVICE — THE ENDO CARRY-ON PROCEDURE KIT CONTAINS ALL OF THE SUPPLIES AND INFECTION PREVENTION PRODUCTS NEEDED FOR ENDOSCOPIC PROCEDURES: Brand: ENDO CARRY-ON PROCEDURE KIT